# Patient Record
Sex: FEMALE | Race: WHITE
[De-identification: names, ages, dates, MRNs, and addresses within clinical notes are randomized per-mention and may not be internally consistent; named-entity substitution may affect disease eponyms.]

---

## 2017-12-18 ENCOUNTER — HOSPITAL ENCOUNTER (OUTPATIENT)
Dept: HOSPITAL 92 - BICMAMMO | Age: 62
Discharge: HOME | End: 2017-12-18
Attending: OBSTETRICS & GYNECOLOGY
Payer: COMMERCIAL

## 2017-12-18 DIAGNOSIS — Z12.31: Primary | ICD-10-CM

## 2017-12-18 PROCEDURE — G0202 SCR MAMMO BI INCL CAD: HCPCS

## 2017-12-18 PROCEDURE — 77067 SCR MAMMO BI INCL CAD: CPT

## 2017-12-18 PROCEDURE — 77063 BREAST TOMOSYNTHESIS BI: CPT

## 2018-01-19 ENCOUNTER — HOSPITAL ENCOUNTER (OUTPATIENT)
Dept: HOSPITAL 92 - BICMAMMO | Age: 63
Discharge: HOME | End: 2018-01-19
Attending: OBSTETRICS & GYNECOLOGY
Payer: COMMERCIAL

## 2018-01-19 DIAGNOSIS — R92.8: Primary | ICD-10-CM

## 2018-01-19 PROCEDURE — G0279 TOMOSYNTHESIS, MAMMO: HCPCS

## 2018-05-15 ENCOUNTER — HOSPITAL ENCOUNTER (OUTPATIENT)
Dept: HOSPITAL 92 - BICULT | Age: 63
Discharge: HOME | End: 2018-05-15
Attending: UROLOGY
Payer: COMMERCIAL

## 2018-05-15 DIAGNOSIS — Q61.3: ICD-10-CM

## 2018-05-15 DIAGNOSIS — R31.0: Primary | ICD-10-CM

## 2018-05-15 DIAGNOSIS — Z88.5: ICD-10-CM

## 2018-05-15 PROCEDURE — 76770 US EXAM ABDO BACK WALL COMP: CPT

## 2018-08-07 ENCOUNTER — HOSPITAL ENCOUNTER (OUTPATIENT)
Dept: HOSPITAL 92 - SCSMRI | Age: 63
Discharge: HOME | End: 2018-08-07
Attending: ORTHOPAEDIC SURGERY
Payer: COMMERCIAL

## 2018-08-07 DIAGNOSIS — S86.312A: ICD-10-CM

## 2018-08-07 DIAGNOSIS — M19.072: ICD-10-CM

## 2018-08-07 DIAGNOSIS — M76.72: Primary | ICD-10-CM

## 2018-08-07 DIAGNOSIS — M65.872: ICD-10-CM

## 2018-08-07 DIAGNOSIS — M85.672: ICD-10-CM

## 2018-08-07 NOTE — MRI
MRI LEFT ANKLE WITHOUT CONTRAST:

 

Date:  08/07/18 

 

HISTORY:  

M76.72, peroneal tendinitis of left lower extremity. 

 

COMPARISON:  

None. 

 

FINDINGS:

 

Ligaments:

The AITFL and PITFL are intact. ATFL is intact, as well as the PTFL and CFL. Superficial and deep del
toid ligaments are intact. 

 

Tendons:

Achilles tendon is intact. Trace retrocalcaneal bursa effusion. 

 

There is mild peroneal tenosynovitis at the level of the lateral malleolus. The peroneal groove has n
ormal concavity posteriorly. There is a subtle interstitial tear of the peroneus brevis tendon 2.0 cm
 below the lateral malleolar tip before reconstituting. 

 

Trace fluid in posterior tibial tendon sheath. Extensor tendons are intact. 

 

Bones:

Along the medial talar dome is a subchondral cyst with extensive overlying cartilage fraying. There i
s abnormal stress edema within the fourth metatarsal head proximal diaphysis, as well as third metata
rsal head. There appears to be sequelae of a combination of degenerative changes and stress edema. Th
ere are advanced degenerative changes of the fourth, third, and second tarsometatarsal joints. 

 

Lisfranc interval appears to be maintained. 

 

There is advanced edema within the medial, intermediate, and lateral cuneiforms. 

 

IMPRESSION: 

1.  Mild peroneal tenosynovitis with a longitudinal split tear of the peroneus brevis tendon approxim
ately 2.0 cm distal to the lateral malleolus for a short length before reconstituting. 

 

2.  Advanced degenerative changes of the midfoot. 

 

3.  Possible stress edema of the third and fourth metatarsal diaphyses versus reactive change from de
generation. 

 

4.  Subcortical cyst of the medial talar dome with advanced overlying cartilage fraying. 

 

 

POS: [facility]

## 2018-12-01 ENCOUNTER — HOSPITAL ENCOUNTER (EMERGENCY)
Dept: HOSPITAL 92 - SCSER | Age: 63
Discharge: HOME | End: 2018-12-01
Payer: COMMERCIAL

## 2018-12-01 DIAGNOSIS — Z79.82: ICD-10-CM

## 2018-12-01 DIAGNOSIS — K21.9: ICD-10-CM

## 2018-12-01 DIAGNOSIS — I10: ICD-10-CM

## 2018-12-01 DIAGNOSIS — Z79.899: ICD-10-CM

## 2018-12-01 DIAGNOSIS — T63.441A: Primary | ICD-10-CM

## 2018-12-01 PROCEDURE — 99282 EMERGENCY DEPT VISIT SF MDM: CPT

## 2019-01-23 ENCOUNTER — HOSPITAL ENCOUNTER (OUTPATIENT)
Dept: HOSPITAL 92 - BICMAMMO | Age: 64
Discharge: HOME | End: 2019-01-23
Attending: OBSTETRICS & GYNECOLOGY
Payer: COMMERCIAL

## 2019-01-23 DIAGNOSIS — R92.1: ICD-10-CM

## 2019-01-23 DIAGNOSIS — Z12.31: Primary | ICD-10-CM

## 2019-01-23 PROCEDURE — 77067 SCR MAMMO BI INCL CAD: CPT

## 2019-01-23 PROCEDURE — 77063 BREAST TOMOSYNTHESIS BI: CPT

## 2019-06-18 ENCOUNTER — HOSPITAL ENCOUNTER (OUTPATIENT)
Dept: HOSPITAL 92 - SCSULT | Age: 64
Discharge: HOME | End: 2019-06-18
Attending: UROLOGY
Payer: MEDICARE

## 2019-06-18 DIAGNOSIS — Q61.3: Primary | ICD-10-CM

## 2019-06-18 PROCEDURE — 76770 US EXAM ABDO BACK WALL COMP: CPT

## 2019-06-18 NOTE — ULT
RENAL ULTRASOUND:

 

HISTORY:

Polycystic kidney disease.

 

COMPARISON:

None.

 

TECHNIQUE:

Sagittal and transverse imaging of the kidneys is performed.

 

FINDINGS:

The right kidney measures 21.1 x 8.2 x 13.4 cm.  The left kidney measures 9.5 x 13.4 x 22.2 cm.  Ther
e are multiple anechoic foci replacing most of the normal cortical echotexture bilaterally.  Represen
tative cysts in the right kidney measure 6.6 x 6.4 x 6.5 cm in the upper pole and 8.6 x 7.3 x 9.0 cm 
in the lower pole.  Representative cysts in the left kidney measure 11.8 x 8.4 x 12.2 cm in the upper
 pole and 10.2 x 6.7 x 9.0 cm in the lower pole.  The bladder is decompressed and cannot be adequatel
y assessed.

 

IMPRESSION:

Bilateral renal cortical cysts.

 

POS: [facility]

## 2019-06-21 ENCOUNTER — HOSPITAL ENCOUNTER (OUTPATIENT)
Dept: HOSPITAL 92 - SCSMRI | Age: 64
Discharge: HOME | End: 2019-06-21
Attending: ORTHOPAEDIC SURGERY
Payer: MEDICARE

## 2019-06-21 DIAGNOSIS — C40.21: Primary | ICD-10-CM

## 2019-06-21 DIAGNOSIS — R31.0: ICD-10-CM

## 2019-06-21 LAB
ESTIMATED GFR-MDRD - POC: 38
RBC UR QL AUTO: (no result) HPF (ref 0–3)
SP GR UR STRIP: 1.01 (ref 1–1.03)
WBC UR QL AUTO: (no result) HPF (ref 0–3)

## 2019-06-21 PROCEDURE — 82565 ASSAY OF CREATININE: CPT

## 2019-06-21 PROCEDURE — 81001 URINALYSIS AUTO W/SCOPE: CPT

## 2019-06-21 NOTE — MRI
MRI Low Ext No Jt Rt W WO Con



HISTORY: Right tib-fib pain. The area of concern was marked along the anterior aspect of the mid calf
 directly anterior to the anterior compartment.



FINDINGS: 

The marrow signal change within the tibia and fibula is normal. There is no evidence of any muscle ed
ema. No signs of any muscle atrophy or any type of denervation process.



Subcutaneous tissue appears unremarkable.



No areas of abnormal contrast enhancement.



A knee prosthesis is noted.







IMPRESSION: Unremarkable right tib-fib MRI study.



Reported By: Abdiaziz Coronel 

Electronically Signed:  6/21/2019 4:03 PM

## 2019-10-12 ENCOUNTER — HOSPITAL ENCOUNTER (OUTPATIENT)
Dept: HOSPITAL 92 - SCSRAD | Age: 64
Discharge: HOME | End: 2019-10-12
Attending: NURSE PRACTITIONER
Payer: MEDICARE
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
## 2019-10-12 PROCEDURE — 72170 X-RAY EXAM OF PELVIS: CPT

## 2019-10-12 NOTE — RAD
LEFT ELBOW FOUR VIEWS:

 

HISTORY:

Fall. Left elbow pain.

 

FINDINGS:

No definite fracture or dislocation is seen.

 

If symptoms do not improve a follow-up exam should be obtained in 3-5 days.

 

POS: [facility]

## 2019-10-12 NOTE — RAD
AP PELVIS:

 

HISTORY:

Fall. Left hip pain.

 

FINDINGS:

No fracture or dislocation is identified.

 

POS: [facility]

## 2019-10-12 NOTE — RAD
LEFT HIP TWO VIEWS:

 

HISTORY:

Fall. Left hip pain.

 

FINDINGS:

No definite fracture or dislocation is identified.

 

POS: [facility]

## 2020-01-29 ENCOUNTER — HOSPITAL ENCOUNTER (OUTPATIENT)
Dept: HOSPITAL 92 - BICMAMMO | Age: 65
Discharge: HOME | End: 2020-01-29
Attending: FAMILY MEDICINE
Payer: MEDICARE

## 2020-01-29 DIAGNOSIS — Z12.31: Primary | ICD-10-CM

## 2020-01-29 PROCEDURE — 77063 BREAST TOMOSYNTHESIS BI: CPT

## 2020-01-29 PROCEDURE — 77067 SCR MAMMO BI INCL CAD: CPT

## 2020-01-29 NOTE — MMO
Bilateral MAMMO Bilat Screen [facility].

 

CLINICAL HISTORY:

Patient is 64 years old and is seen for screening. The patient has no family

history of breast cancer.  The patient has no personal history of cancer.

 

VIEWS:

The views performed were:  bilateral craniocaudal with tomosynthesis and

bilateral mediolateral oblique with tomosynthesis.

 

FILMS COMPARED:

The present examination has been compared to prior imaging studies performed at

[facility] on 12/18/2017, 01/19/2018 and 01/23/2019, and at [facility] on 12/20/2016.

 

This study has been interpreted with the assistance of computer-aided detection.

 

MAMMOGRAM FINDINGS:

The breasts are heterogeneously dense, which could obscure a lesion on

mammography.

 

There are stable benign appearing calcifications seen in both breasts.

 

There are no suspicious masses, suspicious calcifications, or new areas of

architectural distortion.

 

IMPRESSION:

THERE IS NO MAMMOGRAPHIC EVIDENCE OF MALIGNANCY.

 

A ROUTINE FOLLOW-UP MAMMOGRAM IN 1 YEAR IS RECOMMENDED.

 

THE RESULTS OF THIS EXAM WERE SENT TO THE PATIENT.

 

ACR BI-RADS Category 2 - Benign finding

 

MAMMOGRAPHY NOTE:

 1. A negative mammogram report should not delay a biopsy if a dominant of

 clinically suspicious mass is present.

 2. Approximately 10% to 15% of breast cancers are not detected by

 mammography.

 3. Adenosis and dense breasts may obscure an underlying neoplasm.

 

 

Reported by: JOHNATHAN COE MD

Electonically Signed: [id number]

## 2021-02-02 ENCOUNTER — HOSPITAL ENCOUNTER (OUTPATIENT)
Dept: HOSPITAL 92 - BICMAMMO | Age: 66
Discharge: HOME | End: 2021-02-02
Attending: FAMILY MEDICINE
Payer: MEDICARE

## 2021-02-02 DIAGNOSIS — Z12.31: Primary | ICD-10-CM

## 2021-02-02 PROCEDURE — 77067 SCR MAMMO BI INCL CAD: CPT

## 2021-02-02 PROCEDURE — 77063 BREAST TOMOSYNTHESIS BI: CPT

## 2021-02-02 NOTE — MMO
Bilateral MAMMO Bilat Screen [facility].

 

CLINICAL HISTORY:

Patient is 65 years old and is seen for screening. The patient has no family

history of breast cancer.  The patient has no personal history of cancer.

 

VIEWS:

The views performed were:  bilateral craniocaudal with tomosynthesis and

bilateral mediolateral oblique with tomosynthesis.

 

FILMS COMPARED:

The present examination has been compared to prior imaging studies performed at

[facility] on 12/18/2017, 01/19/2018, 01/23/2019 and 01/29/2020.

 

This study has been interpreted with the assistance of computer-aided detection.

 

MAMMOGRAM FINDINGS:

The breasts are heterogeneously dense, which could obscure a lesion on

mammography.

 

There are stable benign appearing calcifications seen in both breasts.

 

There are no suspicious masses, suspicious calcifications, or new areas of

architectural distortion.

 

IMPRESSION:

THERE IS NO MAMMOGRAPHIC EVIDENCE OF MALIGNANCY.

 

A ROUTINE FOLLOW-UP MAMMOGRAM IN 1 YEAR IS RECOMMENDED.

 

THE RESULTS OF THIS EXAM WERE SENT TO THE PATIENT.

 

ACR BI-RADS Category 2 - Benign finding

 

MAMMOGRAPHY NOTE:

 1. A negative mammogram report should not delay a biopsy if a dominant of

 clinically suspicious mass is present.

 2. Approximately 10% to 15% of breast cancers are not detected by

 mammography.

 3. Adenosis and dense breasts may obscure an underlying neoplasm.

 

 

Reported by: TYRELL BEASLEY MD

Electonically Signed: [id number]

## 2023-02-20 ENCOUNTER — HOSPITAL ENCOUNTER (OUTPATIENT)
Dept: HOSPITAL 92 - CSHMAMMO | Age: 68
Discharge: HOME | End: 2023-02-20
Attending: FAMILY MEDICINE
Payer: MEDICARE

## 2023-02-20 DIAGNOSIS — Z12.31: Primary | ICD-10-CM

## 2023-02-20 DIAGNOSIS — N64.89: ICD-10-CM

## 2023-02-20 PROCEDURE — 77067 SCR MAMMO BI INCL CAD: CPT

## 2023-02-20 PROCEDURE — 77063 BREAST TOMOSYNTHESIS BI: CPT

## 2023-02-22 ENCOUNTER — HOSPITAL ENCOUNTER (OUTPATIENT)
Dept: HOSPITAL 92 - BICMAMMO | Age: 68
Discharge: HOME | End: 2023-02-22
Attending: FAMILY MEDICINE
Payer: MEDICARE

## 2023-02-22 DIAGNOSIS — N64.89: Primary | ICD-10-CM

## 2023-02-22 PROCEDURE — 77065 DX MAMMO INCL CAD UNI: CPT

## 2023-02-22 PROCEDURE — G0279 TOMOSYNTHESIS, MAMMO: HCPCS

## 2023-08-24 ENCOUNTER — HOSPITAL ENCOUNTER (OUTPATIENT)
Dept: HOSPITAL 92 - SCSMRI | Age: 68
Discharge: HOME | End: 2023-08-24
Attending: FAMILY MEDICINE
Payer: MEDICARE

## 2023-08-24 DIAGNOSIS — M48.061: ICD-10-CM

## 2023-08-24 DIAGNOSIS — M51.17: ICD-10-CM

## 2023-08-24 DIAGNOSIS — M51.16: Primary | ICD-10-CM

## 2023-08-24 DIAGNOSIS — M51.15: ICD-10-CM

## 2023-08-24 PROCEDURE — 72148 MRI LUMBAR SPINE W/O DYE: CPT

## 2024-03-11 ENCOUNTER — HOSPITAL ENCOUNTER (OUTPATIENT)
Dept: HOSPITAL 92 - CSHMAMMO | Age: 69
Discharge: HOME | End: 2024-03-11
Attending: FAMILY MEDICINE
Payer: MEDICARE

## 2024-03-11 DIAGNOSIS — Z12.31: Primary | ICD-10-CM

## 2024-03-11 PROCEDURE — 77067 SCR MAMMO BI INCL CAD: CPT

## 2024-03-11 PROCEDURE — 77063 BREAST TOMOSYNTHESIS BI: CPT

## 2024-03-18 ENCOUNTER — HOSPITAL ENCOUNTER (OUTPATIENT)
Age: 69
Discharge: HOME | End: 2024-03-18
Payer: MEDICARE

## 2024-03-18 DIAGNOSIS — Q61.2: ICD-10-CM

## 2024-03-18 DIAGNOSIS — E28.2: Primary | ICD-10-CM
